# Patient Record
Sex: MALE | Race: WHITE | Employment: OTHER | URBAN - METROPOLITAN AREA
[De-identification: names, ages, dates, MRNs, and addresses within clinical notes are randomized per-mention and may not be internally consistent; named-entity substitution may affect disease eponyms.]

---

## 2020-02-03 ENCOUNTER — TELEPHONE (OUTPATIENT)
Dept: CONSULT | Facility: CLINIC | Age: 70
End: 2020-02-03

## 2020-02-03 NOTE — TELEPHONE ENCOUNTER
Received referral for genetics.  Schedule with genetic counselor.    Diagnosis: Family history of pulmonary embolism    Seema

## 2020-02-10 ENCOUNTER — OFFICE VISIT (OUTPATIENT)
Dept: CONSULT | Facility: CLINIC | Age: 70
End: 2020-02-10
Attending: GENETIC COUNSELOR, MS
Payer: MEDICARE

## 2020-02-10 DIAGNOSIS — E11.9 TYPE 2 DIABETES MELLITUS (H): ICD-10-CM

## 2020-02-10 DIAGNOSIS — Z71.83 ENCOUNTER FOR NONPROCREATIVE GENETIC COUNSELING: ICD-10-CM

## 2020-02-10 DIAGNOSIS — Z82.49 FAMILY HISTORY OF BLOOD CLOTS: ICD-10-CM

## 2020-02-10 DIAGNOSIS — Z82.49 FAMILY HISTORY OF PULMONARY EMBOLISM: Primary | ICD-10-CM

## 2020-02-10 DIAGNOSIS — E11.40 TYPE 2 DIABETES MELLITUS WITH DIABETIC NEUROPATHY, UNSPECIFIED WHETHER LONG TERM INSULIN USE (H): ICD-10-CM

## 2020-02-10 PROCEDURE — 96040 ZZH GENETIC COUNSELING, EACH 30 MINUTES: CPT | Mod: ZF | Performed by: GENETIC COUNSELOR, MS

## 2020-02-10 NOTE — Clinical Note
2/10/2020      RE: Mason Schwartz  [address]  [address]  [place] [address]       ***    Nanci Mckeon,

## 2020-02-17 NOTE — PROGRESS NOTES
"Presenting information:   Mason Schwartz is a 69 year old male with a family history of pulmonary embolisms. He was referred for a genetics evaluation from [facility], and was seen today at the [facility]. I met with Mason to obtain a personal and family history and discuss the genetics of blood clots.    Personal History:   Mason reports an upcoming cardio surgery, which prompted this discussion about his family history of blood clots. He personally has never had a blood clot. He has a diagnosis of type II diabetes (dx 9 years ago) and reports high lipids and high cholesterol, as well as neuropathy. He follows with Endocrinology.     Mason notes no concerns for his birth or development. He has a history of surgeries for removal of tonsils, his knee/ligaments, an umbilical hernia (2019), and a skin graft for squamosa cell carcinoma (diagnosed in 2019). He follows with Dermatology.  Additional history includes GERD, psoriasis, arthritis, IBS, sleep apnea, obesity, and knee pain.    Family History:   A three generation pedigree was obtained today and sent to be scanned into the EMR. The family history was significant for the following:    Mason has two daughters and two sons, who have no health concerns.    Mason has one sister, who passed away at 66 years after a surgery for a pulmonary embolism (PE). She had a history of obesity and hypertension. She has two sons. One son has a history of club feet, for which he had surgery for. Mason also has a maternal half sibling, whose history is unknown.    Mason's mother passed away at 78 years from atrial fibrillation. She had a history of a \"broken heart\" for which she was recommended to get a pacemaker (but did not). Additional history includes a PE after a motor vehicle accident at 72 years, a thyroidectomy, and a hysterectomy.     Mason's maternal uncle passed away at 96 years from a stroke. Mason's maternal aunt passed away at 87 years from end stage renal disease " reportedly from diabetes. Mason reports no major health concerns for his maternal first cousins.     Mason's maternal grandfather passed away at 56 from an acute MI. His maternal grandmother passed away at 92 years.    Mason's father passed away at 66 years from a reported insulin overdose. He had a history of Parkinson's and CAD for which he had open heart surgery.    Mason has two paternal uncles, who passed away at 87 and in their 60's respectively. He is unsure of their health history, though reports the uncle who passed away in his 60's may have had atrial fibrillation. Mason is not aware of any major health concerns for his paternal first cousins.     Mason's paternal grandmother passed away at 68. He is unsure of her health history. Mason's paternal grandfather passed away at 72 from complications from diabetes and a stroke.    The family history was otherwise negative for individuals with known genetic conditions and multiple miscarriages. Mason is of Spanish/[place] ancestry on his maternal side and paternal side. Consanguinity was denied.    Discussion:   Genes are long stretches of DNA that are responsible for how our bodies look and how our bodies work. We all have two copies of every gene, one inherited from our mother and one inherited from our father. When there is a change, called a mutation, in a gene it can cause it to not do its job correctly which can cause the signs and symptoms of a genetic condition.     We discussed both what is know and unknown about the genetics of blood clots at this time. We discussed that blood clots are best explained by a multifactorial inheritance pattern. This means that many factors are involved in causing a clot. The factors are usually both genetic and environmental: a combination of genes from both parents, in addition to unknown environmental factors, produce the trait or condition. Specifically, there are many known factors that contribute to risk of blood clots.  These include lifestyle, environment, and genetics. Non-genetic risk factors include age, obesity, surgery, smoking, the use of hormonal contraceptives in women, medications, being sedentary, and after injury or surgery.    We discussed that there are genetic blood clot risk factors as well, such as Factor V Leiden and Prothrombin Thrombophilia. For example, Factor V (F5) is a gene that normally plays an important role in clotting the blood after injury.  We all have two copies of this gene- one from each parent. Factor V Leiden refers to a specific mutation in this gene. This mutation causes the Factor V gene to be active longer than needed. This increases the risk for abnormal blood clots. Approximately 5% of individuals of  ancestry have at least one copy of the Factor V Leiden mutation.  Individuals with one copy of Factor V Leiden have approximately a 5% risk for a blood clot by age 65. When an individual has one copy of the Factor V Leiden mutation, each of their children would have a 50% chance of inheriting the same change. Having two copies of the Factor V Leiden is much less common, but more significantly increases the risk for blood clots. Prothrombin Thrombophilia mutations on the F2 gene are inherited in a similar pattern.    We discussed that while genetic testing does exist for these genetic blood clot risk factors, often individuals are not recommended to have genetic testing for them unless they have a personal history of a blood clot, are a female considering birth control, or it would change their management. This is largely because they are only a piece of someone's risk: many individuals with these conditions never have a blood clot, and many individuals without these conditions do have blood clots. Therefore, even if Mason had Factor V Leiden or another similar genetic risk factor, this would only be one piece to his risk. Therefore, discussed that it was important for Mason to discuss  his family history and his personal risk factors with his surgery team before his upcoming surgery. If his surgery team felt the knowledge of one of these genetic risk factors may change his surgery management, certainly we could further discuss genetic testing for blood clot risk factors, or likely even better, more expansive work up/testing with Hematology (which would likely include multiple risk factors beyond just Factor V and Prothrombin).    Discussed that other family members should ensure their healthcare providers are aware of the family history, especially in the presence of these additional risk factors.         It was a pleasure to meet with Mason today. He had no additional questions at this time. Contact information was shared for any future questions or concerns that arise.    Plan:   1. Reviewed genetics of blood clots and risk factors.  2. Recommended Mason talk to his surgery team about his family history and personal risk factors. Certainly if they felt a genetic risk factor could , genetic testing or more expansive Hematology evaluation/testing could be pursued.  3. Contact information was provided should any questions arise in the future.       Christina Mckeon MS, [facility]  Genetic Counselor  Division of Genetics and Metabolism  [facility]   Phone: [phone]  Pager: [phone]      Approximate Time Spent in Consultation: 30 minutes  CC: No letter